# Patient Record
Sex: MALE
[De-identification: names, ages, dates, MRNs, and addresses within clinical notes are randomized per-mention and may not be internally consistent; named-entity substitution may affect disease eponyms.]

---

## 2018-05-20 ENCOUNTER — HOSPITAL ENCOUNTER (EMERGENCY)
Dept: HOSPITAL 14 - H.ER | Age: 28
Discharge: HOME | End: 2018-05-20
Payer: COMMERCIAL

## 2018-05-20 VITALS
TEMPERATURE: 98 F | RESPIRATION RATE: 18 BRPM | SYSTOLIC BLOOD PRESSURE: 122 MMHG | HEART RATE: 78 BPM | DIASTOLIC BLOOD PRESSURE: 78 MMHG

## 2018-05-20 VITALS — OXYGEN SATURATION: 98 %

## 2018-05-20 DIAGNOSIS — J30.2: Primary | ICD-10-CM

## 2018-05-20 NOTE — ED PDOC
HPI: General Adult


Time Seen by Provider: 05/20/18 16:45


Chief Complaint (Nursing): Abnormal Skin Integrity


Chief Complaint (Provider): Abnormal Skin Integrity


History Per: Patient


History/Exam Limitations: no limitations


Onset/Duration Of Symptoms: Mins (prior to arrival)


Current Symptoms Are (Timing): Better


Additional Complaint(s): 


29 y/o male with past medical history of allergies presents to the ED 

complaining of hives and shortness of breath while he was outside. Patient was 

seen by EMS and given oxygen. Reports that he feels improved indoors and his 

hives are resolving. Denies any further medical complaints.





PMD: Ronnell Gutierrez MD








Past Medical History


Reviewed: Historical Data, Nursing Documentation, Vital Signs


Vital Signs: 


 Last Vital Signs











Temp  98 F   05/20/18 17:41


 


Pulse  78   05/20/18 17:41


 


Resp  18   05/20/18 17:41


 


BP  122/78   05/20/18 17:41


 


Pulse Ox  100   05/20/18 17:41














- Medical History


Other PMH: Allergies





- Family History


Family History: States: Unknown Family Hx





- Home Medications


Home Medications: 


 Ambulatory Orders











 Medication  Instructions  Recorded


 


DiphenhydrAMINE [Benadryl] 1 - 2 tab PO Q6 PRN #24 cap 05/15/16


 


Epinephrine [Epipen 2-Jesús] 0.3 mg IJ ONCE PRN #1 auto.injct 05/15/16


 


Famotidine [Pepcid] 1 tab PO BID #10 tab 05/15/16


 


Prednisone 3 tab PO DAILY #12 tablet 05/15/16


 


Ondansetron [Zofran] 4 mg PO Q8H #10 tab 07/18/16


 


Albuterol HFA [Ventolin HFA 90 2 puff IH [id number] PRN #1 inhaler 05/20/18





mcg/actuation (8 g)]  


 


Cetirizine HCl [Zyrtec] 10 mg PO DAILY #15 tab.rapdis 05/20/18


 


Fluticasone Nasal [Flonase] 2 actuation NS DAILY #1 bottle 05/20/18


 


Prednisone [Deltasone] 2 tab PO DAILY #8 tablet 05/20/18














- Allergies


Allergies/Adverse Reactions: 


 Allergies











Allergy/AdvReac Type Severity Reaction Status Date / Time


 


shellfish derived Allergy  SWELLING Verified 05/20/18 16:42


 


shrimp Allergy  SWELLING Verified 05/20/18 16:42














Review of Systems


ROS Statement: Except As Marked, All Systems Reviewed And Found Negative (As 

per HPI,otherwise negative)





Physical Exam





- Reviewed


Nursing Documentation Reviewed: Yes


Vital Signs Reviewed: Yes





- Physical Exam


Appears: Positive for: Non-toxic, No Acute Distress


Skin: Positive for: Rash (Mild urticaria noted on right side of the neck)


Eye Exam: Positive for: Normal appearance, Other (Scaly skin noted on eyelids 

bilaterally)


Neck: Positive for: Normal


Respiratory: Positive for: Normal Breath Sounds (lungs clear to auscultation 

bilaterally).  Negative for: Accessory Muscle Use, Respiratory Distress


Back: Positive for: Normal Inspection


Extremity: Positive for: Normal ROM


Neurologic/Psych: Positive for: Alert, Oriented





- ECG


O2 Sat by Pulse Oximetry: 98 (RA)


Pulse Ox Interpretation: Normal





Medical Decision Making


Medical Decision Making: 


Time: 16:54





Initial Impression: Allergic reaction





Plan:


Prednisone 40mg PO


Reevaluation





--------------------------------------------------------------------------------

-----------------


Scribe Attestation:


Documented by Kevin Chand acting as a scribe for Elba Chand MD.


      


MD Scribe Attestation:


All medical record entries made by the Scribe were at my direction and 

personally dictated by me. I have reviewed the chart and agree that the record 

accurately reflects my personal performance of the history, physical exam, 

medical decision making, and the department course for this patient. I have 

also personally directed, reviewed, and agree with the discharge instructions 

and disposition.








Disposition





- Clinical Impression


Clinical Impression: 


 Seasonal allergies








- Patient ED Disposition


Is Patient to be Admitted: No





- Disposition


Disposition: Routine/Home


Disposition Time: 17:41


Condition: FAIR


Prescriptions: 


Albuterol HFA [Ventolin HFA 90 mcg/actuation (8 g)] 2 puff IH [id number] PRN #1 

inhaler


 PRN Reason: Shortness Of Breath


Cetirizine HCl [Zyrtec] 10 mg PO DAILY #15 tab.rapdis


Fluticasone Nasal [Flonase] 2 actuation NS DAILY #1 bottle


Prednisone [Deltasone] 2 tab PO DAILY #8 tablet


Instructions:  Seasonal Allergies (DC)


Forms:  [facility] [facility] (English), [facility] ED School/Work Excuse